# Patient Record
Sex: FEMALE
[De-identification: names, ages, dates, MRNs, and addresses within clinical notes are randomized per-mention and may not be internally consistent; named-entity substitution may affect disease eponyms.]

---

## 2019-02-28 ENCOUNTER — APPOINTMENT (OUTPATIENT)
Dept: ORTHOPEDIC SURGERY | Facility: CLINIC | Age: 69
End: 2019-02-28
Payer: MEDICARE

## 2019-02-28 VITALS
DIASTOLIC BLOOD PRESSURE: 68 MMHG | SYSTOLIC BLOOD PRESSURE: 117 MMHG | HEART RATE: 112 BPM | BODY MASS INDEX: 24.54 KG/M2 | HEIGHT: 60 IN | WEIGHT: 125 LBS

## 2019-02-28 PROCEDURE — 99214 OFFICE O/P EST MOD 30 MIN: CPT

## 2019-02-28 PROCEDURE — 73110 X-RAY EXAM OF WRIST: CPT | Mod: RT

## 2019-02-28 RX ORDER — MOMETASONE FUROATE 1 MG/G
0.1 CREAM TOPICAL
Qty: 45 | Refills: 0 | Status: ACTIVE | COMMUNITY
Start: 2018-12-12

## 2019-02-28 RX ORDER — PRAVASTATIN SODIUM 80 MG/1
80 TABLET ORAL
Qty: 90 | Refills: 0 | Status: ACTIVE | COMMUNITY
Start: 2017-12-18

## 2019-02-28 NOTE — DISCUSSION/SUMMARY
[FreeTextEntry1] : The patient has right ulnar wrist pain. Pain began, associated with hyperpronation of the wrist. Pain has been present approximately one year. \par Wrist support splint provided.\par I performed additional review of radiographs after the patient left the office. Review of radiographs demonstrates  scapholunate dissociation and suggestion of flexion of scaphoid. Possibly, scapholunate disruption/dissociation could cause ulnar wrist pain, although relatively uncommon.\par \par Second most likely diagnosis is TFCC tear. Cortisone injection may be indicated depending upon clinical course and MRI findings.If the patient continues with pain, MRI will be obtained.\par I have left a voicemail message on the patient's home telephone on describing the scapholunate dissociation.\par The patient should call or return if symptoms continue. If wrist splint is effective in controlling symptoms, no further treatment required.\par Because symptoms have been present for one year, prognosis, limited.\par  A lengthy and detailed discussion was held with the patient regarding analysis, treatment, and recommendations. All questions have been answered. At the conclusion the patient expressed acceptance and understanding.

## 2019-02-28 NOTE — PHYSICAL EXAM
[de-identified] : right wrist:\par tenderness ulnar mid axis at TFCC.\par Mod tenderness dorsal TFCC.\par ECU/6th compartment mild tenderness.\par some tenderness palpating triquetrum and hamate dorso ulnar.\par 5th CMC: min discomfort w palp and manip.\par LT:stable, 2+\par PT: no pain\par FCU: no pain\par Wrist E/F: 60/50°, mild pain at maximum flexion\par P/S:65°/85° without pain\par basal jt mild crep. min pain\par thumb MP: enlarged, E/F: -10/20 degrees, considerable stiffness, min pain\par No A1 pulley tenderness and no triggering in any finger.\par No pertinent MP, PIP, or DIP joint contributory findings, except some Heberden's nodes; none are clinically painful.\par \par Left wrist\par basal joint: Dorsal subluxation first metacarpal base. When reduced it immediately re re subluxes. \par No pain associated minimal to no crepitus associated with this manipulation. \par MP hyperextension 30°. No pain at MP or basal joint with manipulation. No crepitus.\par Otherwise, comparable wrist joint examination compared to right: no pertinent positive findings.\par Left hand\par No A1 pulley tenderness and no triggering in any finger.\par No pertinent MP, PIP, or DIP joint contributory findings, except some Heberden's nodes; none are clinically painful.\par \par Neurologic: Median, ulnar, and radial motor and sensory are intact. \par Skin: No cyanosis, clubbing, or rashes.\par Vascular: Radial pulses intact.\par Lymphatic: No streaking or epitrochlear adenopathy.\par The patient is awake, alert, and oriented. Affect appropriate. Cooperative.  [de-identified] : Radiographs ordered and interpreted by me today, reviewed and discussed with the patient today.\par 4 views, right wrist, right hand.\par Wrist: Scapholunate widening. Corresponding flexion of scaphoid on PA radiographs. Broad ulnar head. Ulnar neutral variance. Sclerosis, sigmoid notch. Basal joint narrowing. Volar beak osteophyte of first metacarpal consistent with basal joint osteoarthritis. Stage II. Advanced osteoarthritic degenerative changes thumb MP joint. Large area her motion, dorsal proximal two thirds articular surface, proximal phalanx base. Large osteophytes, metacarpal head. Minimal marginal degenerative change with slight narrowing, PIP 5, and DIP, 2, 3, 4, 5. No fractures or dislocations.

## 2019-02-28 NOTE — HISTORY OF PRESENT ILLNESS
[FreeTextEntry1] : Pt is a 67 yo RHD female retired  presents with new pain in ulnar aspect of the Right wrist for approximately 1 year. Patient reports about 1 year ago she accidently twisted he right wrist inward, and as a result she has been experiencing pain in the ulnar aspect of the right wrist. Patient states doing certain movements such as; rotating the right wrist, turning a door knob, opening jars, lifting objects, exacerbate the pain. She has applied voltaren gel or CBD oil over the right wrist for pain as needed, which have helped. No prior cortisone injections in the right wrist. Patient states she sometimes wraps an ACE bandage around the right wrist, which helps. \par \par Hx. B/L 1st CMC OA\par History of marked degenerative changes with associated pain MP joint right thumb. the patient's had multiple cortisone injections in the past. \par B/L CTR 2002 and 2008.

## 2019-03-01 ENCOUNTER — MESSAGE (OUTPATIENT)
Age: 69
End: 2019-03-01

## 2020-03-05 ENCOUNTER — APPOINTMENT (OUTPATIENT)
Dept: ORTHOPEDIC SURGERY | Facility: CLINIC | Age: 70
End: 2020-03-05
Payer: MEDICARE

## 2020-03-05 VITALS
BODY MASS INDEX: 24.54 KG/M2 | DIASTOLIC BLOOD PRESSURE: 79 MMHG | SYSTOLIC BLOOD PRESSURE: 134 MMHG | HEIGHT: 60 IN | WEIGHT: 125 LBS | HEART RATE: 94 BPM

## 2020-03-05 PROCEDURE — 73110 X-RAY EXAM OF WRIST: CPT | Mod: LT

## 2020-03-05 PROCEDURE — 99214 OFFICE O/P EST MOD 30 MIN: CPT

## 2021-01-07 ENCOUNTER — APPOINTMENT (OUTPATIENT)
Dept: ORTHOPEDIC SURGERY | Facility: CLINIC | Age: 71
End: 2021-01-07
Payer: MEDICARE

## 2021-01-07 PROCEDURE — 99214 OFFICE O/P EST MOD 30 MIN: CPT | Mod: 25

## 2021-01-07 PROCEDURE — 20526 THER INJECTION CARP TUNNEL: CPT | Mod: RT

## 2021-02-09 ENCOUNTER — APPOINTMENT (OUTPATIENT)
Dept: OTOLARYNGOLOGY | Facility: CLINIC | Age: 71
End: 2021-02-09
Payer: MEDICARE

## 2021-02-09 VITALS
BODY MASS INDEX: 22.18 KG/M2 | WEIGHT: 110 LBS | HEART RATE: 97 BPM | SYSTOLIC BLOOD PRESSURE: 155 MMHG | HEIGHT: 59 IN | TEMPERATURE: 97 F | DIASTOLIC BLOOD PRESSURE: 78 MMHG

## 2021-02-09 DIAGNOSIS — R63.4 ABNORMAL WEIGHT LOSS: ICD-10-CM

## 2021-02-09 DIAGNOSIS — R22.0 LOCALIZED SWELLING, MASS AND LUMP, HEAD: ICD-10-CM

## 2021-02-09 DIAGNOSIS — F90.9 ATTENTION-DEFICIT HYPERACTIVITY DISORDER, UNSPECIFIED TYPE: ICD-10-CM

## 2021-02-09 DIAGNOSIS — H93.13 TINNITUS, BILATERAL: ICD-10-CM

## 2021-02-09 DIAGNOSIS — R22.1 LOCALIZED SWELLING, MASS AND LUMP, HEAD: ICD-10-CM

## 2021-02-09 PROCEDURE — 92563 TONE DECAY HEARING TEST: CPT

## 2021-02-09 PROCEDURE — 92588 EVOKED AUDITORY TST COMPLETE: CPT

## 2021-02-09 PROCEDURE — 31231 NASAL ENDOSCOPY DX: CPT

## 2021-02-09 PROCEDURE — 92570 ACOUSTIC IMMITANCE TESTING: CPT

## 2021-02-09 PROCEDURE — 92557 COMPREHENSIVE HEARING TEST: CPT

## 2021-02-09 PROCEDURE — 99214 OFFICE O/P EST MOD 30 MIN: CPT | Mod: 25

## 2021-02-09 RX ORDER — BROMPHENIRAMINE MALEATE, PSEUDOEPHEDRINE HYDROCHLORIDE, 2; 30; 10 MG/5ML; MG/5ML; MG/5ML
30-2-10 SYRUP ORAL
Qty: 180 | Refills: 0 | Status: DISCONTINUED | COMMUNITY
Start: 2018-11-20 | End: 2021-02-09

## 2021-02-09 RX ORDER — OMEPRAZOLE 20 MG/1
20 CAPSULE, DELAYED RELEASE ORAL
Qty: 90 | Refills: 0 | Status: DISCONTINUED | COMMUNITY
Start: 2018-08-30 | End: 2021-02-09

## 2021-02-09 NOTE — CONSULT LETTER
[Dear  ___] : Dear  [unfilled], [Courtesy Letter:] : I had the pleasure of seeing your patient, [unfilled], in my office today. [Please see my note below.] : Please see my note below. [Consult Closing:] : Thank you very much for allowing me to participate in the care of this patient.  If you have any questions, please do not hesitate to contact me. [Sincerely,] : Sincerely, [FreeTextEntry3] : Saleem Gambino MD FACS

## 2021-02-09 NOTE — ASSESSMENT
[FreeTextEntry1] : Reviewed and reconciled medications, allergies, PMHx, PSHx, SocHx, FMHx.\par \par h/o cough, reflux, dysphagia, CP bar - doing well \par edema postcricoid, arytenoids and VC's\par \par Audio: hearing WNL sloping to a moderately severe SNHL, asymmetry noted at 1-1.5 k hz and 3-4k hz, right 72% discrimination at 70 db, left 80% discrimination at 70 db\par \par Plan:\par Flexible Nasal Endoscopy with a Look at the Larynx. Audio - results interpreted by Dr. Gambino and reviewed with the patient. Consider amplification. Lab work: T3, T4, TSH. ABR. FU after tests.

## 2021-02-09 NOTE — HISTORY OF PRESENT ILLNESS
[de-identified] : The patient presents with h/o cough, reflux, dysphagia, CP bar. Pt states that her sx have been better. She will occasionally gag on liquids. Her sinuses have been good. She has tinnitus which is worse at night. Pt woke up with a sore throat on the right side 1 week ago. She gets swollen glands on the right side intermittently for months. She states that 2 years ago she had a sinus infection and since then she has had a problem with her smell and taste. She states that she has lost 20 pounds and is hyper. She has been taking collagen which has gotten rid of her IBS.

## 2021-02-09 NOTE — ADDENDUM
[FreeTextEntry1] : Documented by Elaine Patino acting as scribe for Dr. Gambino on 02/09/2021.\par \par All Medical record entries made by the Scribe were at my, Dr. Gambino, direction and personally dictated by me on 02/09/2021 . I have reviewed the chart and agree that the record accurately reflects my personal performance of the history, physical exam, assessment and plan. I have also personally directed, reviewed, and agreed with the discharge instructions.

## 2021-02-09 NOTE — PROCEDURE
[FreeTextEntry6] : Procedure:  Flexible Nasal Endoscopy with a Look at the Larynx: Risks, benefits, and alternatives of flexible endoscopy were explained to the patient.  Specific mention was made of the risk of throat numbness.  The patient gave oral consent to proceed.  The nasal cavities were decongested and anesthetized with a combination of oxymetazoline and 4% lidocaine solution.  The flexible scope was inserted into the right nasal cavity.  Endoscopy of the inferior and middle meatus was performed.  No polyp, mass, or lesion was appreciated.  Olfactory cleft was clear. Spheno-ethmoid recess clear. Nasopharynx was clear.  Turbinates were without mass. Procedure repeated on the contralateral side with similar findings. Oropharyngeal walls were symmetric and mobile without lesion, mass, or edema.  Hypopharynx was also without  lesion or edema.  Larynx was mobile without lesions. Edema postcricoid, arytenoids and VC's. Base of tongue was within normal limits. The patient tolerated the procedure well.

## 2021-02-09 NOTE — PHYSICAL EXAM
[Midline] : trachea located in midline position [Normal] : no rashes [de-identified] : right submandibular gland larger than the left, soft [Hearing Loss Right Only] : normal [Hearing Loss Left Only] : normal [FreeTextEntry5] : No response to cardona.

## 2021-02-09 NOTE — REVIEW OF SYSTEMS
[Seasonal Allergies] : seasonal allergies [Post Nasal Drip] : post nasal drip [Hearing Loss] : hearing loss [Ear Noises] : ear noises [Nasal Congestion] : nasal congestion [Recurrent Sinus Infections] : recurrent sinus infections [Sense Of Smell Problem] : sense of smell problem [Hoarseness] : hoarseness [Throat Clearing] : throat clearing [Throat Dryness] : throat dryness [Throat Itching] : throat itching [Depression] : depression [Swollen Glands] : swollen glands [FreeTextEntry7] : difficulty swallowing

## 2021-02-24 ENCOUNTER — APPOINTMENT (OUTPATIENT)
Dept: PHARMACY | Facility: CLINIC | Age: 71
End: 2021-02-24

## 2021-02-24 ENCOUNTER — APPOINTMENT (OUTPATIENT)
Dept: OTOLARYNGOLOGY | Facility: CLINIC | Age: 71
End: 2021-02-24

## 2021-03-03 ENCOUNTER — APPOINTMENT (OUTPATIENT)
Dept: PHARMACY | Facility: CLINIC | Age: 71
End: 2021-03-03
Payer: MEDICARE

## 2021-03-03 ENCOUNTER — APPOINTMENT (OUTPATIENT)
Dept: OTOLARYNGOLOGY | Facility: CLINIC | Age: 71
End: 2021-03-03
Payer: MEDICARE

## 2021-03-03 PROCEDURE — 92653 AEP NEURODIAGNOSTIC I&R: CPT

## 2021-03-03 PROCEDURE — V5299A: CUSTOM | Mod: NC

## 2021-03-09 ENCOUNTER — APPOINTMENT (OUTPATIENT)
Dept: OTOLARYNGOLOGY | Facility: CLINIC | Age: 71
End: 2021-03-09
Payer: MEDICARE

## 2021-03-09 VITALS
DIASTOLIC BLOOD PRESSURE: 80 MMHG | BODY MASS INDEX: 22.18 KG/M2 | HEIGHT: 59 IN | HEART RATE: 98 BPM | WEIGHT: 110 LBS | TEMPERATURE: 97.1 F | SYSTOLIC BLOOD PRESSURE: 140 MMHG

## 2021-03-09 DIAGNOSIS — J39.2 OTHER DISEASES OF PHARYNX: ICD-10-CM

## 2021-03-09 DIAGNOSIS — R13.10 DYSPHAGIA, UNSPECIFIED: ICD-10-CM

## 2021-03-09 PROCEDURE — 99214 OFFICE O/P EST MOD 30 MIN: CPT

## 2021-03-09 NOTE — ADDENDUM
[FreeTextEntry1] : Documented by Elaine Patino acting as scribe for Dr. Gambino on 03/09/2021.\par \par All Medical record entries made by the Scribe were at my, Dr. Gambino, direction and personally dictated by me on 03/09/2021 . I have reviewed the chart and agree that the record accurately reflects my personal performance of the history, physical exam, assessment and plan. I have also personally directed, reviewed, and agreed with the discharge instructions.

## 2021-03-09 NOTE — HISTORY OF PRESENT ILLNESS
[de-identified] : The patient presents with h/o cough, reflux, dysphagia, CP bar, asymmetric SNHL. Pt presents today for results. She would like to get amplification. She still has a problem swallowing when she eats too much at one time or if bending over.

## 2021-03-09 NOTE — ASSESSMENT
[FreeTextEntry1] : Reviewed and reconciled medications, allergies, PMHx, PSHx, SocHx, FMHx.\par \par h/o cough, reflux, dysphagia, CP bar, asymmetric SNHL\par \par Audio 2/9/21: hearing WNL sloping to a moderately severe SNHL, asymmetry noted at 1-1.5 k hz and 3-4k hz, right 72% discrimination at 70 db, left 80% discrimination at 70 db\par \par ABR 3/3/21: normal\par \par Lab work: T3 normal, T4 normal, TSH normal\par \par Plan:\par Reviewed audio, ABR, T3, T4, TSH results. Pace yourself while eating. FU with audiologist regarding amplification. Retest hearing if don't get amplification before 6 months.

## 2021-03-23 ENCOUNTER — APPOINTMENT (OUTPATIENT)
Dept: PHARMACY | Facility: CLINIC | Age: 71
End: 2021-03-23
Payer: SELF-PAY

## 2021-03-23 PROCEDURE — V5010 ASSESSMENT FOR HEARING AID: CPT | Mod: NC

## 2021-03-25 ENCOUNTER — APPOINTMENT (OUTPATIENT)
Dept: ORTHOPEDIC SURGERY | Facility: CLINIC | Age: 71
End: 2021-03-25
Payer: MEDICARE

## 2021-03-25 DIAGNOSIS — M18.12 UNILATERAL PRIMARY OSTEOARTHRITIS OF FIRST CARPOMETACARPAL JOINT, LEFT HAND: ICD-10-CM

## 2021-03-25 DIAGNOSIS — M25.532 PAIN IN LEFT WRIST: ICD-10-CM

## 2021-03-25 DIAGNOSIS — R20.0 ANESTHESIA OF SKIN: ICD-10-CM

## 2021-03-25 DIAGNOSIS — M25.531 PAIN IN RIGHT WRIST: ICD-10-CM

## 2021-03-25 DIAGNOSIS — R20.2 ANESTHESIA OF SKIN: ICD-10-CM

## 2021-03-25 PROCEDURE — 99214 OFFICE O/P EST MOD 30 MIN: CPT

## 2021-04-06 ENCOUNTER — APPOINTMENT (OUTPATIENT)
Dept: PHARMACY | Facility: CLINIC | Age: 71
End: 2021-04-06
Payer: SELF-PAY

## 2021-04-06 PROCEDURE — V5010 ASSESSMENT FOR HEARING AID: CPT

## 2021-04-15 ENCOUNTER — APPOINTMENT (OUTPATIENT)
Dept: PHARMACY | Facility: CLINIC | Age: 71
End: 2021-04-15
Payer: SELF-PAY

## 2021-04-15 PROCEDURE — V5261A: CUSTOM

## 2021-04-20 ENCOUNTER — OUTPATIENT (OUTPATIENT)
Dept: OUTPATIENT SERVICES | Facility: HOSPITAL | Age: 71
LOS: 1 days | End: 2021-04-20
Payer: MEDICARE

## 2021-04-20 VITALS
TEMPERATURE: 99 F | RESPIRATION RATE: 18 BRPM | OXYGEN SATURATION: 96 % | HEART RATE: 99 BPM | SYSTOLIC BLOOD PRESSURE: 140 MMHG | WEIGHT: 111.99 LBS | HEIGHT: 60 IN | DIASTOLIC BLOOD PRESSURE: 90 MMHG

## 2021-04-20 DIAGNOSIS — Z01.818 ENCOUNTER FOR OTHER PREPROCEDURAL EXAMINATION: ICD-10-CM

## 2021-04-20 DIAGNOSIS — Z98.890 OTHER SPECIFIED POSTPROCEDURAL STATES: Chronic | ICD-10-CM

## 2021-04-20 DIAGNOSIS — Z98.1 ARTHRODESIS STATUS: Chronic | ICD-10-CM

## 2021-04-20 DIAGNOSIS — Z98.890 OTHER SPECIFIED POSTPROCEDURAL STATES: ICD-10-CM

## 2021-04-20 DIAGNOSIS — Z98.49 CATARACT EXTRACTION STATUS, UNSPECIFIED EYE: Chronic | ICD-10-CM

## 2021-04-20 DIAGNOSIS — Z96.641 PRESENCE OF RIGHT ARTIFICIAL HIP JOINT: Chronic | ICD-10-CM

## 2021-04-20 DIAGNOSIS — G56.01 CARPAL TUNNEL SYNDROME, RIGHT UPPER LIMB: ICD-10-CM

## 2021-04-20 LAB
ANION GAP SERPL CALC-SCNC: 12 MMOL/L — SIGNIFICANT CHANGE UP (ref 5–17)
BUN SERPL-MCNC: 22 MG/DL — SIGNIFICANT CHANGE UP (ref 7–23)
CALCIUM SERPL-MCNC: 10.1 MG/DL — SIGNIFICANT CHANGE UP (ref 8.4–10.5)
CHLORIDE SERPL-SCNC: 107 MMOL/L — SIGNIFICANT CHANGE UP (ref 96–108)
CO2 SERPL-SCNC: 25 MMOL/L — SIGNIFICANT CHANGE UP (ref 22–31)
CREAT SERPL-MCNC: 0.71 MG/DL — SIGNIFICANT CHANGE UP (ref 0.5–1.3)
GLUCOSE SERPL-MCNC: 92 MG/DL — SIGNIFICANT CHANGE UP (ref 70–99)
HCT VFR BLD CALC: 38.3 % — SIGNIFICANT CHANGE UP (ref 34.5–45)
HGB BLD-MCNC: 12 G/DL — SIGNIFICANT CHANGE UP (ref 11.5–15.5)
MCHC RBC-ENTMCNC: 29.5 PG — SIGNIFICANT CHANGE UP (ref 27–34)
MCHC RBC-ENTMCNC: 31.3 GM/DL — LOW (ref 32–36)
MCV RBC AUTO: 94.1 FL — SIGNIFICANT CHANGE UP (ref 80–100)
NRBC # BLD: 0 /100 WBCS — SIGNIFICANT CHANGE UP (ref 0–0)
PLATELET # BLD AUTO: 347 K/UL — SIGNIFICANT CHANGE UP (ref 150–400)
POTASSIUM SERPL-MCNC: 4.9 MMOL/L — SIGNIFICANT CHANGE UP (ref 3.5–5.3)
POTASSIUM SERPL-SCNC: 4.9 MMOL/L — SIGNIFICANT CHANGE UP (ref 3.5–5.3)
RBC # BLD: 4.07 M/UL — SIGNIFICANT CHANGE UP (ref 3.8–5.2)
RBC # FLD: 13.1 % — SIGNIFICANT CHANGE UP (ref 10.3–14.5)
SODIUM SERPL-SCNC: 144 MMOL/L — SIGNIFICANT CHANGE UP (ref 135–145)
WBC # BLD: 8.31 K/UL — SIGNIFICANT CHANGE UP (ref 3.8–10.5)
WBC # FLD AUTO: 8.31 K/UL — SIGNIFICANT CHANGE UP (ref 3.8–10.5)

## 2021-04-20 PROCEDURE — U0005: CPT

## 2021-04-20 PROCEDURE — U0003: CPT

## 2021-04-20 PROCEDURE — G0463: CPT

## 2021-04-20 PROCEDURE — 85027 COMPLETE CBC AUTOMATED: CPT

## 2021-04-20 PROCEDURE — C9803: CPT

## 2021-04-20 PROCEDURE — 80048 BASIC METABOLIC PNL TOTAL CA: CPT

## 2021-04-20 RX ORDER — SODIUM CHLORIDE 9 MG/ML
3 INJECTION INTRAMUSCULAR; INTRAVENOUS; SUBCUTANEOUS EVERY 8 HOURS
Refills: 0 | Status: DISCONTINUED | OUTPATIENT
Start: 2021-04-23 | End: 2021-05-07

## 2021-04-20 RX ORDER — CHLORHEXIDINE GLUCONATE 213 G/1000ML
1 SOLUTION TOPICAL ONCE
Refills: 0 | Status: DISCONTINUED | OUTPATIENT
Start: 2021-04-23 | End: 2021-05-07

## 2021-04-20 RX ORDER — LIDOCAINE HCL 20 MG/ML
0.2 VIAL (ML) INJECTION ONCE
Refills: 0 | Status: DISCONTINUED | OUTPATIENT
Start: 2021-04-23 | End: 2021-05-07

## 2021-04-20 NOTE — H&P PST ADULT - ATTENDING COMMENTS
The patient had right carpal tunnel release approximately 2002 and reports having had considerable relief until summer 2020. Symptoms were initially intermittent and have now become constant in the thumb index and long fingers. Provocative testing, Kim test, does not exacerbate symptoms. Repeat electrodiagnostic study demonstrated carpal tunnel syndrome on the right. Cortisone injection administered 2 months ago provided limited improvement. The slight improvement suggests that with surgery the patient may also have some improvement. However, complete resolution of carpal tunnel syndrome is not likely.     Consequently, because the patient has symptoms that are consistent with recurrence of right carpal tunnel syndrome, because electrodiagnostic studies confirm right carpal tunnel syndrome, and because the patient had transient improvement from cortisone injection, the patient has a chance that there will be some improvement after carpal tunnel release, but without any assurance or guarantee. Treatment options are very limited. Any long-term benefit from other treatment modalities is very unlikely.     I reviewed all of the above and many other factors at length and in detail with the patient in the office, and reviewed them again preop. In addition, because the patient had previous carpal tunnel release on the right, there is high risk of complication or some type of injury to the median nerve with a repeat carpal tunnel release. Risks, complications, expectations, and limitations have been discussed with the patient. Patient expressed acceptance and understanding of all above factors.     Surgery for right carpal tunnel syndrome is indicated because of symptoms refractory to conservative treatment, interfering with sleep, and activities of daily living. Because of the duration and severity of carpal tunnel syndrome, because the patient having repeat carpal tunnel release there is increased risk of region median nerve compared to unoperated carpal tunnel. In addition, because of the considerable intensity and severity of symptoms and the degree of  abnormality of the recent electrodiagnostic study, ongoing symptoms can be expected postoperatively. While the patient may see improvement, there is no assurance of this. The possibility of little improvement or of no improvement exists. Even though it is low, the possibility of worsening exists as well. Risk of injury to the median nerve, CRPS, persistent paresthesias, wound related pain, weakness, and many other factors, reviewed and discussed.     A lengthy and detailed discussion has been held with the patient. The surgical plan, alternative treatments, and the associated risks, complications, limitations, and expectations have been discussed with the patient. Postoperative plan, need for exercising to regain motion and function, wound care, dressing care, activities, follow up, and possible need for hand therapy have been reviewed and discussed. In addition, the expectation of postop wound related pain, wound induration, swelling, weakness of , alteration of hand and finger use and function, and palmar and forearm tenderness were reviewed. In particular, the expectation of weakness, difficulty with daily activities, and wound induration often lasting six months have been stressed.     All questions have been answered. The patient has expressed understanding and acceptance of the above and has consented to surgery.

## 2021-04-20 NOTE — H&P PST ADULT - HISTORY OF PRESENT ILLNESS
This is a 70 year old female with past medical history of HTN, HLD h/o murmur,     Reports having right hand numbness for one year and  recently noticing items were dropping from the hands. Tried conservative treatments with steriodal injections. Pt is now presenting to PST for scheduled Right wrist carpal tunnel release on 4/23 with Dr. Berg      **Covid test results pending  This is a 70 year old female with past medical history of HTN, HLD h/o murmur, Right hip replacement spinal stenosis s/p lumbar spinal fusion. Reports having right hand numbness for one year and  recently noticing items were dropping from the hands. Tried conservative treatments with steriodal injections. Pt is now presenting to PST for scheduled Right wrist carpal tunnel release on 4/23 with Dr. Berg      **Covid test results pending 4/20

## 2021-04-20 NOTE — H&P PST ADULT - NSICDXPASTMEDICALHX_GEN_ALL_CORE_FT
PAST MEDICAL HISTORY:  ADD (attention deficit disorder)     H/O osteopenia     HLD (hyperlipidemia)     HTN (hypertension)     OA (osteoarthritis)

## 2021-04-20 NOTE — H&P PST ADULT - NSICDXPASTSURGICALHX_GEN_ALL_CORE_FT
PAST SURGICAL HISTORY:  H/O sinus surgery Sinus and pillar implants 2008    H/O spinal fusion Dec 2013    History of carpal tunnel release Right 2002    History of carpal tunnel release Left 2008    History of right hip replacement June 2020    S/P cataract surgery

## 2021-04-20 NOTE — H&P PST ADULT - HEALTH CARE MAINTENANCE
Follows up PCP every 6 months and Cardiology annually   Fully Covid vaccinated (Pfizer series)   Mammo 2019& Colonoscopy 2020

## 2021-04-20 NOTE — H&P PST ADULT - NSICDXPROBLEM_GEN_ALL_CORE_FT
PROBLEM DIAGNOSES  Problem: History of carpal tunnel surgery  Assessment and Plan: Scheduled for Right wrist carpal tunnel release  on 4/23 with Dr. Berg  Preop isntructions and chlorhexidine soap  Labs CBC BMP performed in PST  Covid test results pending.

## 2021-04-22 ENCOUNTER — TRANSCRIPTION ENCOUNTER (OUTPATIENT)
Age: 71
End: 2021-04-22

## 2021-04-22 RX ORDER — SODIUM CHLORIDE 9 MG/ML
1000 INJECTION, SOLUTION INTRAVENOUS
Refills: 0 | Status: DISCONTINUED | OUTPATIENT
Start: 2021-04-23 | End: 2021-05-07

## 2021-04-23 ENCOUNTER — OUTPATIENT (OUTPATIENT)
Dept: OUTPATIENT SERVICES | Facility: HOSPITAL | Age: 71
LOS: 1 days | End: 2021-04-23
Payer: MEDICARE

## 2021-04-23 ENCOUNTER — APPOINTMENT (OUTPATIENT)
Dept: ORTHOPEDIC SURGERY | Facility: HOSPITAL | Age: 71
End: 2021-04-23

## 2021-04-23 VITALS
TEMPERATURE: 98 F | OXYGEN SATURATION: 98 % | DIASTOLIC BLOOD PRESSURE: 66 MMHG | HEART RATE: 81 BPM | RESPIRATION RATE: 16 BRPM | SYSTOLIC BLOOD PRESSURE: 117 MMHG

## 2021-04-23 VITALS
HEART RATE: 70 BPM | HEIGHT: 60 IN | WEIGHT: 111.99 LBS | OXYGEN SATURATION: 98 % | SYSTOLIC BLOOD PRESSURE: 107 MMHG | TEMPERATURE: 98 F | DIASTOLIC BLOOD PRESSURE: 69 MMHG | RESPIRATION RATE: 20 BRPM

## 2021-04-23 DIAGNOSIS — Z96.641 PRESENCE OF RIGHT ARTIFICIAL HIP JOINT: Chronic | ICD-10-CM

## 2021-04-23 DIAGNOSIS — G56.01 CARPAL TUNNEL SYNDROME, RIGHT UPPER LIMB: ICD-10-CM

## 2021-04-23 DIAGNOSIS — Z98.890 OTHER SPECIFIED POSTPROCEDURAL STATES: Chronic | ICD-10-CM

## 2021-04-23 DIAGNOSIS — Z01.818 ENCOUNTER FOR OTHER PREPROCEDURAL EXAMINATION: ICD-10-CM

## 2021-04-23 DIAGNOSIS — Z98.1 ARTHRODESIS STATUS: Chronic | ICD-10-CM

## 2021-04-23 DIAGNOSIS — Z98.49 CATARACT EXTRACTION STATUS, UNSPECIFIED EYE: Chronic | ICD-10-CM

## 2021-04-23 PROCEDURE — 64721 CARPAL TUNNEL SURGERY: CPT | Mod: RT

## 2021-04-23 RX ORDER — FEXOFENADINE HCL 30 MG
1 TABLET ORAL
Qty: 0 | Refills: 0 | DISCHARGE

## 2021-04-23 RX ORDER — ETODOLAC 400 MG/1
1 TABLET, FILM COATED ORAL
Qty: 0 | Refills: 0 | DISCHARGE

## 2021-04-23 RX ORDER — FLUTICASONE PROPIONATE 50 MCG
1 SPRAY, SUSPENSION NASAL
Qty: 0 | Refills: 0 | DISCHARGE

## 2021-04-23 RX ORDER — METHYLPHENIDATE HCL 5 MG
1 TABLET ORAL
Qty: 0 | Refills: 0 | DISCHARGE

## 2021-04-23 RX ORDER — ACETAMINOPHEN 500 MG
625 TABLET ORAL
Qty: 0 | Refills: 0 | DISCHARGE

## 2021-04-23 RX ORDER — LISINOPRIL/HYDROCHLOROTHIAZIDE 10-12.5 MG
1 TABLET ORAL
Qty: 0 | Refills: 0 | DISCHARGE

## 2021-04-23 RX ORDER — ESCITALOPRAM OXALATE 10 MG/1
1 TABLET, FILM COATED ORAL
Qty: 0 | Refills: 0 | DISCHARGE

## 2021-04-23 NOTE — ASU PREOP CHECKLIST - BSA (M2)
12.9   8.8   )-----------( 191      ( 17 Dec 2017 01:50 )             37.9     17 Dec 2017 01:50    140    |  105    |  13     ----------------------------<  132    3.4     |  23     |  0.57     Ca    9.1        17 Dec 2017 01:50    TPro  7.2    /  Alb  4.5    /  TBili  0.4    /  DBili  x      /  AST  29     /  ALT  25     /  AlkPhos  46     17 Dec 2017 01:50    PT/INR - ( 17 Dec 2017 01:50 )   PT: 11.9 sec;   INR: 1.09 ratio         PTT - ( 17 Dec 2017 01:50 )  PTT:31.7 sec  Vital Signs Last 24 Hrs  T(C): 36.9 (12-17-17 @ 06:26), Max: 36.9 (12-17-17 @ 06:26)  T(F): 98.5 (12-17-17 @ 06:26), Max: 98.5 (12-17-17 @ 06:26)  HR: 70 (12-17-17 @ 06:26) (70 - 78)  BP: 113/72 (12-17-17 @ 06:26) (99/62 - 116/69)  BP(mean): --  RR: 16 (12-17-17 @ 06:26) (16 - 16)  SpO2: 100% (12-17-17 @ 06:26) (97% - 100%)    Imaging: XR/CT were personally reviewed and demonstrates Left valgus impacted femoral neck fracture and a Right inferior pubic ramus fracture 1.46

## 2021-04-29 ENCOUNTER — APPOINTMENT (OUTPATIENT)
Dept: ORTHOPEDIC SURGERY | Facility: CLINIC | Age: 71
End: 2021-04-29
Payer: MEDICARE

## 2021-04-29 DIAGNOSIS — R43.0 ANOSMIA: ICD-10-CM

## 2021-04-29 DIAGNOSIS — M19.041 PRIMARY OSTEOARTHRITIS, RIGHT HAND: ICD-10-CM

## 2021-04-29 PROCEDURE — 99024 POSTOP FOLLOW-UP VISIT: CPT

## 2021-05-25 ENCOUNTER — APPOINTMENT (OUTPATIENT)
Dept: PHARMACY | Facility: CLINIC | Age: 71
End: 2021-05-25
Payer: SELF-PAY

## 2021-05-25 PROBLEM — M19.90 UNSPECIFIED OSTEOARTHRITIS, UNSPECIFIED SITE: Chronic | Status: ACTIVE | Noted: 2021-04-20

## 2021-05-25 PROBLEM — I10 ESSENTIAL (PRIMARY) HYPERTENSION: Chronic | Status: ACTIVE | Noted: 2021-04-20

## 2021-05-25 PROBLEM — E78.5 HYPERLIPIDEMIA, UNSPECIFIED: Chronic | Status: ACTIVE | Noted: 2021-04-20

## 2021-05-25 PROBLEM — F98.8 OTHER SPECIFIED BEHAVIORAL AND EMOTIONAL DISORDERS WITH ONSET USUALLY OCCURRING IN CHILDHOOD AND ADOLESCENCE: Chronic | Status: ACTIVE | Noted: 2021-04-20

## 2021-05-25 PROBLEM — Z87.39 PERSONAL HISTORY OF OTHER DISEASES OF THE MUSCULOSKELETAL SYSTEM AND CONNECTIVE TISSUE: Chronic | Status: ACTIVE | Noted: 2021-04-20

## 2021-05-25 PROCEDURE — V5299A: CUSTOM | Mod: NC

## 2021-06-04 ENCOUNTER — TRANSCRIPTION ENCOUNTER (OUTPATIENT)
Age: 71
End: 2021-06-04

## 2021-06-08 ENCOUNTER — APPOINTMENT (OUTPATIENT)
Dept: PHARMACY | Facility: CLINIC | Age: 71
End: 2021-06-08
Payer: MEDICARE

## 2021-06-08 PROCEDURE — V5299A: CUSTOM | Mod: NC

## 2022-05-20 ENCOUNTER — APPOINTMENT (OUTPATIENT)
Dept: OTOLARYNGOLOGY | Facility: CLINIC | Age: 72
End: 2022-05-20
Payer: MEDICARE

## 2022-05-20 VITALS
HEIGHT: 59 IN | HEART RATE: 108 BPM | WEIGHT: 110 LBS | BODY MASS INDEX: 22.18 KG/M2 | DIASTOLIC BLOOD PRESSURE: 76 MMHG | SYSTOLIC BLOOD PRESSURE: 127 MMHG

## 2022-05-20 PROCEDURE — 99213 OFFICE O/P EST LOW 20 MIN: CPT

## 2022-05-20 PROCEDURE — 92567 TYMPANOMETRY: CPT

## 2022-05-20 PROCEDURE — 92557 COMPREHENSIVE HEARING TEST: CPT

## 2022-05-20 RX ORDER — ALCLOMETASONE DIPROPIONATE 0.5 MG/G
0.05 CREAM TOPICAL
Refills: 0 | Status: ACTIVE | COMMUNITY

## 2022-05-20 RX ORDER — MUPIROCIN 20 MG/G
2 OINTMENT TOPICAL
Refills: 0 | Status: ACTIVE | COMMUNITY

## 2022-05-20 RX ORDER — GINGER ROOT/GINGER ROOT EXT 262.5 MG
CAPSULE ORAL
Refills: 0 | Status: ACTIVE | COMMUNITY

## 2022-05-20 RX ORDER — GUAIFENESIN 1200 MG/1
TABLET, EXTENDED RELEASE ORAL
Refills: 0 | Status: ACTIVE | COMMUNITY

## 2022-05-20 RX ORDER — MUPIROCIN 20 MG/G
2 OINTMENT TOPICAL
Qty: 1 | Refills: 0 | Status: ACTIVE | COMMUNITY
Start: 2019-01-23

## 2022-05-20 NOTE — ASSESSMENT
[FreeTextEntry1] : Simin Choi presents for follow-up. She notes increased allergic rhinitis symptoms and is requesting refill of her medications. She is continuing to take her oral antihistamine. In addition, she has tried using hearing aids but returned them due to discomfort. [facility] ABR was normal. Audiogram today was stable from prior. \par \par - Fluticasone and mupirocin ointment refilled.\par - Continue sinus rinses.\par - Continue oral antihistamine.\par - Follow up in 1 year for repeat audiogram.

## 2022-05-20 NOTE — HISTORY OF PRESENT ILLNESS
[de-identified] : Simin Choi is a 72 yo female with hx cough, reflux, dysphagia, CP bar, sensorineural hearing loss who presents for refill of fluticasone nasal spray. She notes history of allergic rhinitis and has had increased nasal congestion and rhinorrhea. She denies sinus pressure and has had sinus surgery previously. She has not had any recent sinus infections. She denies fevers or chills. She notes she has had previously allergy testing which was positive for multiple allergens. She has had previous immunotherapy. She generally uses fexofenadine and fluticasone nasal spray during her allergy seasons. She intermittently irrigates her sinuses every few days. She notes that her subjective hearing is essentially stable. She denies otalgia, otorrhea. She notes constant bilateral nonpulsatile tinnitus. She denies vertigo. She tried hearing aids last year, but returned them due to discomfort.\par \par She notes that her dysphagia and gagging are improved. She notes that her weight is stable.

## 2022-05-20 NOTE — REVIEW OF SYSTEMS
[Seasonal Allergies] : seasonal allergies [Hearing Loss] : hearing loss [Nasal Congestion] : nasal congestion [Negative] : Heme/Lymph

## 2022-05-20 NOTE — DATA REVIEWED
[de-identified] : Tymps: Type As AD, type A AS AU\par Audio: WNL sloping to mod sev SNHL 250-8000 Hz\par *Essentially stable thresholds/WRS re: audio 2/2021*\par Recs: 1. ENT f/u, 2. Re-eval as per MD

## 2022-09-14 ENCOUNTER — APPOINTMENT (OUTPATIENT)
Dept: OTOLARYNGOLOGY | Facility: CLINIC | Age: 72
End: 2022-09-14

## 2022-09-14 VITALS
HEART RATE: 97 BPM | DIASTOLIC BLOOD PRESSURE: 68 MMHG | SYSTOLIC BLOOD PRESSURE: 110 MMHG | HEIGHT: 59 IN | BODY MASS INDEX: 20.56 KG/M2 | WEIGHT: 102 LBS

## 2022-09-14 DIAGNOSIS — J38.4 EDEMA OF LARYNX: ICD-10-CM

## 2022-09-14 DIAGNOSIS — J31.0 CHRONIC RHINITIS: ICD-10-CM

## 2022-09-14 DIAGNOSIS — R49.0 DYSPHONIA: ICD-10-CM

## 2022-09-14 DIAGNOSIS — H90.5 UNSPECIFIED SENSORINEURAL HEARING LOSS: ICD-10-CM

## 2022-09-14 PROCEDURE — 31575 DIAGNOSTIC LARYNGOSCOPY: CPT

## 2022-09-14 PROCEDURE — 99213 OFFICE O/P EST LOW 20 MIN: CPT | Mod: 25

## 2022-09-15 ENCOUNTER — NON-APPOINTMENT (OUTPATIENT)
Age: 72
End: 2022-09-15

## 2022-09-16 NOTE — HISTORY OF PRESENT ILLNESS
[de-identified] : Patient states that since she had surgery as HSS on July 26 and has noticed that she has lost her voice. The more she speaks the faster she loses her vice

## 2022-09-16 NOTE — ASSESSMENT
[FreeTextEntry1] : Reviewed and reconciled medications, allergies, PMHx, PSHx, SocHx, FMHx.\par \par h/o cough, reflux, dysphagia, CP bar, asymmetric SNHL\par \par Procedure:  Flexible Fiberoptic Laryngoscopy: Risks, benefits, and alternatives of flexible endoscopy were explained to the patient.  The patient gave oral consent to proceed.  The flexible scope was inserted into the right nasal cavity and advanced towards the nasopharynx.  Visualized mucosa over the turbinates and septum were as described above.  The nasopharynx was clear.  Oropharyngeal walls were symmetric and mobile without lesion, mass, or edema.  Hypopharynx was also without  lesion or edema.  Larynx was mobile without lesions. Supraglottic structures with edema. True vocal cords with solving hemorrhage on th anterior commissure and aryepiglottic folds. True vocal folds were white without mass or lesion.  Base of tongue was within normal limits.\par \par \par Plan:\par videostrobe\par Follow up after test

## 2022-09-16 NOTE — CONSULT LETTER
[Consult Letter:] : I had the pleasure of evaluating your patient, [unfilled]. [Please see my note below.] : Please see my note below. [Consult Closing:] : Thank you very much for allowing me to participate in the care of this patient.  If you have any questions, please do not hesitate to contact me. [Sincerely,] : Sincerely, [FreeTextEntry2] : Dr. Ophelia De La Rosa,  [FreeTextEntry3] : Dr. Saleem Gambino

## 2022-09-27 ENCOUNTER — APPOINTMENT (OUTPATIENT)
Dept: OTOLARYNGOLOGY | Facility: CLINIC | Age: 72
End: 2022-09-27

## 2022-09-27 PROCEDURE — 31579 LARYNGOSCOPY TELESCOPIC: CPT | Mod: GN

## 2022-10-03 ENCOUNTER — NON-APPOINTMENT (OUTPATIENT)
Age: 72
End: 2022-10-03

## 2022-10-03 NOTE — ASSESSMENT
[FreeTextEntry1] : Speech/ Language/ Voice Evaluation and REPORT OF EVALUATION OF VOCAL FUNCTION VIA VIDEOSTROBOSCOPY AND BEHAVIORAL/PERCEPTUAL ANALYSIS:\par \par HISTORY: Information on this patient has been provided by the patient and via chart review. Simin Choi is a 72 year-old female who was seen for a videostroboscopy and behavioral voice evaluation to formally assess vocal function..\par \par REASON FOR REFERRAL: To formally assess vocal function. The patient was alert, pleasant and cooperative upon arrival to today's evaluation. The patient presents with complaints of a sudden onset of vocal changes after having shoulder replacement surgery on 7/26/22. Patient states always having a deep voice, however upon waking up from surgery she noted "lots of squeaking" and a "labored" voice. Further, she states voice has improved mildly, however it has not returned to baseline. the patient denies difficulty swallowing, recent history of pneumonia and unintentional weight loss. 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.2)\par Dysphagia (787.20) (R13.10)\par Hyperactivity (314.9) (F90.9)\par Laryngeal edema determined by laryngoscopy (478.6) (J38.4)\par Left wrist pain (719.43) (M25.532)\par Numbness and tingling in right hand (782.0) (R20.0,R20.2)\par Persistent hoarseness (784.42) (R49.0)\par Primary osteoarthritis of first carpometacarpal joint of left hand (715.14) (M18.12)\par Primary osteoarthritis of first carpometacarpal joint of right hand (715.14) (M18.11)\par Primary osteoarthritis of right hand (715.14) (M19.041)\par Right wrist pain (719.43) (M25.531)\par Slac (scapholunate advanced collapse) wrist, 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)\par SNHL (sensorineural hearing loss) (389.10) (H90.5)\par Submandibular swelling (784.2) (R22.0,R22.1)\par Tinnitus of both ears (388.30) (H93.13)\par 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)\par Weight loss of more than 10% body weight (783.21) (R63.4)\par \par Past Medical History\par History of Anosmia (781.1) (R43.0)\par History of Herniated disc (722.2)\par History of High cholesterol (272.0) (E78.00)\par History of arthritis (V13.4) (Z87.39)\par History of hypertension (V12.59) (Z86.79)\par History of spinal stenosis (V13.59) (Z87.39)\par History of Spondylisthesis (756.12) (M43.10)\par \par Current Meds\par Acetaminophen CAPS\par Alclometasone Dipropionate 0.05 % External Cream\par Calcium + D3 TABS\par Calcium TABS\par Ciprofloxacin 500 MG TABS\par Collagen CAPS\par Etodolac  MG Oral Tablet Extended Release 24 Hour\par Fexofenadine HCl TABS\par Fluticasone Propionate 50 MCG/ACT Nasal Suspension; SPRAY 2 SPRAYS INTO EACH\par NOSTRIL ONCE DAILY AS DIRECTED\par Lisinopril-hydroCHLOROthiazide 10-12.5 MG Oral Tablet\par Methylphenidate HCl ER 20 MG Oral Tablet Extended Release\par Mometasone Furoate 0.1 % External Cream; APPLY TO ECZEMA AREAS BID FOR 2\par WEEKS THEN PRN FOR FLARE UPS\par Mupirocin 2 % External Ointment; SALEEM TO LEFT NARES TID\par Mupirocin 2 % External Ointment\par Pravastatin Sodium 80 MG Oral Tablet; TK 1 T PO  QD\par \par Allergies\par oxyCODONE HCl TABS\par Other\par Seasonal Allergies\par \par Current Respiratory Status: _x_ Normal ___ Tracheostomy Tube\par \par VIDEOSTROBOSCOPY:\par The patient was explained the videostroboscopy procedure, and consent was obtained. A nasendoscope was passed via the right nasal passage and positioned above the supraglottic structures. The epiglottis, aryepiglottic folds, arytenoids, and true vocal folds were clearly visible with moderate edema and erythema of the arytenoids, interarytenoid and postcricoid space. There is thick mucous located in the valleculae at baseline. Assessment of the true vocal folds during quiet inspiration/expiration revealed moderate to severe edema, on R>L side, consistent with possible polypoid changes. In addition, there is localized deep red/purplish color along the anterior commissure, consistent with possible hemorrhagic changes. Assessment of TVF abduction and adduction revealed bilateral motion during phonation. There is, however, asymmetrical vibratory pattern with incomplete glottic closure across phonatory tasks. In addition, there is occasionally abnormal adduction of the anterior vocal cords during deep inspiration, which reduces glottic space and indicates possible vocal cord dysfunction. At this point, testing was discontinued and the scope carefully removed, with the patient tolerating the procedure without difficulty.\par \par BEHAVIORAL VOICE EVALUATION:\par Perceptually, Simin Choi presents with a moderate to severe dysphonia marked by a hoarse, strained and breathy vocal quality accompanied by impaired projection and restricted pitch range. In addition, the patient is noted to exhibit both breathy onset and use of hard glottal attack throughout conversational tasks. There is also evidence of perilaryngeal muscle tension and reduced coordination of respiratory/phonatory processes. Maximum phonation time is reduced as compared to age-matched peers. Resonance is WFL. \par \par IMPRESSIONS: Dysphonia\par \par PROGNOSIS: Good with therapeutic intervention\par \par RECOMMENDATIONS:\par 1) Voice therapy (CPT – 23897) is recommended 1x per week, for 6-8 weeks, to maximize vocal function.\par 2) Maintain good vocal hygiene, as briefly discussed with patient upon completion of today’s evaluation. A written educational handout was also provided.\par 3) Follow up with referring ENT as directed.\par \par EDUCATION: Evaluation results were discussed with the patient with full understanding demonstrated.\par \par Should you have additional questions/concerns, please contact this service at [phone].\par \par Codie Rivera M.S., CCC-SLP\par \par \par \par

## 2022-10-03 NOTE — ASSESSMENT
[FreeTextEntry1] : Speech/ Language/ Voice Evaluation and REPORT OF EVALUATION OF VOCAL FUNCTION VIA VIDEOSTROBOSCOPY AND BEHAVIORAL/PERCEPTUAL ANALYSIS:\par \par HISTORY: Information on this patient has been provided by the patient and via chart review. Simin Choi is a 72 year-old female who was seen for a videostroboscopy and behavioral voice evaluation to formally assess vocal function..\par \par REASON FOR REFERRAL: To formally assess vocal function. The patient was alert, pleasant and cooperative upon arrival to today's evaluation. The patient presents with complaints of a sudden onset of vocal changes after having shoulder replacement surgery on 7/26/22. Patient states always having a deep voice, however upon waking up from surgery she noted "lots of squeaking" and a "labored" voice. Further, she states voice has improved mildly, however it has not returned to baseline. the patient denies difficulty swallowing, recent history of pneumonia and unintentional weight loss. 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.2)\par Dysphagia (787.20) (R13.10)\par Hyperactivity (314.9) (F90.9)\par Laryngeal edema determined by laryngoscopy (478.6) (J38.4)\par Left wrist pain (719.43) (M25.532)\par Numbness and tingling in right hand (782.0) (R20.0,R20.2)\par Persistent hoarseness (784.42) (R49.0)\par Primary osteoarthritis of first carpometacarpal joint of left hand (715.14) (M18.12)\par Primary osteoarthritis of first carpometacarpal joint of right hand (715.14) (M18.11)\par Primary osteoarthritis of right hand (715.14) (M19.041)\par Right wrist pain (719.43) (M25.531)\par Slac (scapholunate advanced collapse) wrist, 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)\par SNHL (sensorineural hearing loss) (389.10) (H90.5)\par Submandibular swelling (784.2) (R22.0,R22.1)\par Tinnitus of both ears (388.30) (H93.13)\par 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)\par Weight loss of more than 10% body weight (783.21) (R63.4)\par \par Past Medical History\par History of Anosmia (781.1) (R43.0)\par History of Herniated disc (722.2)\par History of High cholesterol (272.0) (E78.00)\par History of arthritis (V13.4) (Z87.39)\par History of hypertension (V12.59) (Z86.79)\par History of spinal stenosis (V13.59) (Z87.39)\par History of Spondylisthesis (756.12) (M43.10)\par \par Current Meds\par Acetaminophen CAPS\par Alclometasone Dipropionate 0.05 % External Cream\par Calcium + D3 TABS\par Calcium TABS\par Ciprofloxacin 500 MG TABS\par Collagen CAPS\par Etodolac  MG Oral Tablet Extended Release 24 Hour\par Fexofenadine HCl TABS\par Fluticasone Propionate 50 MCG/ACT Nasal Suspension; SPRAY 2 SPRAYS INTO EACH\par NOSTRIL ONCE DAILY AS DIRECTED\par Lisinopril-hydroCHLOROthiazide 10-12.5 MG Oral Tablet\par Methylphenidate HCl ER 20 MG Oral Tablet Extended Release\par Mometasone Furoate 0.1 % External Cream; APPLY TO ECZEMA AREAS BID FOR 2\par WEEKS THEN PRN FOR FLARE UPS\par Mupirocin 2 % External Ointment; SALEEM TO LEFT NARES TID\par Mupirocin 2 % External Ointment\par Pravastatin Sodium 80 MG Oral Tablet; TK 1 T PO  QD\par \par Allergies\par oxyCODONE HCl TABS\par Other\par Seasonal Allergies\par \par Current Respiratory Status: _x_ Normal ___ Tracheostomy Tube\par \par VIDEOSTROBOSCOPY:\par The patient was explained the videostroboscopy procedure, and consent was obtained. A nasendoscope was passed via the right nasal passage and positioned above the supraglottic structures. The epiglottis, aryepiglottic folds, arytenoids, and true vocal folds were clearly visible with moderate edema and erythema of the arytenoids, interarytenoid and postcricoid space. There is thick mucous located in the valleculae at baseline. Assessment of the true vocal folds during quiet inspiration/expiration revealed moderate to severe edema, on R>L side, consistent with possible polypoid changes. In addition, there is localized deep red/purplish color along the anterior commissure, consistent with possible hemorrhagic changes. Assessment of TVF abduction and adduction revealed bilateral motion during phonation. There is, however, asymmetrical vibratory pattern with incomplete glottic closure across phonatory tasks. In addition, there is occasionally abnormal adduction of the anterior vocal cords during deep inspiration, which reduces glottic space and indicates possible vocal cord dysfunction. At this point, testing was discontinued and the scope carefully removed, with the patient tolerating the procedure without difficulty.\par \par BEHAVIORAL VOICE EVALUATION:\par Perceptually, Simin Choi presents with a moderate to severe dysphonia marked by a hoarse, strained and breathy vocal quality accompanied by impaired projection and restricted pitch range. In addition, the patient is noted to exhibit both breathy onset and use of hard glottal attack throughout conversational tasks. There is also evidence of perilaryngeal muscle tension and reduced coordination of respiratory/phonatory processes. Maximum phonation time is reduced as compared to age-matched peers. Resonance is WFL. \par \par IMPRESSIONS: Dysphonia\par \par PROGNOSIS: Good with therapeutic intervention\par \par RECOMMENDATIONS:\par 1) Voice therapy (CPT – 02574) is recommended 1x per week, for 6-8 weeks, to maximize vocal function.\par 2) Maintain good vocal hygiene, as briefly discussed with patient upon completion of today’s evaluation. A written educational handout was also provided.\par 3) Follow up with referring ENT as directed.\par \par EDUCATION: Evaluation results were discussed with the patient with full understanding demonstrated.\par \par Should you have additional questions/concerns, please contact this service at [phone].\par \par Codie Rivera M.S., CCC-SLP\par \par \par \par

## 2022-10-12 ENCOUNTER — APPOINTMENT (OUTPATIENT)
Dept: OTOLARYNGOLOGY | Facility: CLINIC | Age: 72
End: 2022-10-12

## 2022-10-18 ENCOUNTER — APPOINTMENT (OUTPATIENT)
Dept: OTOLARYNGOLOGY | Facility: CLINIC | Age: 72
End: 2022-10-18

## 2022-11-04 ENCOUNTER — APPOINTMENT (OUTPATIENT)
Dept: OTOLARYNGOLOGY | Facility: CLINIC | Age: 72
End: 2022-11-04

## 2022-11-04 VITALS
DIASTOLIC BLOOD PRESSURE: 77 MMHG | SYSTOLIC BLOOD PRESSURE: 132 MMHG | HEIGHT: 59 IN | BODY MASS INDEX: 20.96 KG/M2 | HEART RATE: 105 BPM | WEIGHT: 104 LBS

## 2022-11-04 PROCEDURE — 99213 OFFICE O/P EST LOW 20 MIN: CPT | Mod: 25

## 2022-11-04 PROCEDURE — 31575 DIAGNOSTIC LARYNGOSCOPY: CPT

## 2022-11-04 NOTE — HISTORY OF PRESENT ILLNESS
[de-identified] : Simin Choi presents for evaluation. She has history of dysphonia after intubation for surgery in July 2022. She was seen by SLP and found to have vocal cord edema, possible vocal cord dysfunction, and incomplete glottic closure. She did not undergo voice therapy. She notes that her voice has slightly improved but continues to feel it is hoarse. She denies dyspnea or aspiration episodes. 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 She denies odynophagia, heartburn, food regurgitation. She denies fevers or chills.

## 2022-11-04 NOTE — ASSESSMENT
[FreeTextEntry1] : Simin Schmidt presents for evaluation of dysphonia after intubation for surgery in 7/2022. She had videostroboscopy which showed bilateral vocal cord edema, glottic insufficiency, possible vocal cord dysfunction. She did not undergo voice therapy. Flexible larygnoscopy today shows glottic insufficiency and bilateral vocal cord edema. She also has postcricoid inflammation consistent with laryngopharyngeal reflux. Will treat reflux and send for voice therapy. Per her record, it states that she takes protonix but she does not believe she is on a reflux medication.\par \par - Famotidine 20 mg qhs\par - Antireflux precautions\par - Voice therapy\par - Follow up in 3 months

## 2023-01-14 ENCOUNTER — RX RENEWAL (OUTPATIENT)
Age: 73
End: 2023-01-14

## 2023-02-02 ENCOUNTER — APPOINTMENT (OUTPATIENT)
Dept: OTOLARYNGOLOGY | Facility: CLINIC | Age: 73
End: 2023-02-02
Payer: MEDICARE

## 2023-02-02 VITALS
DIASTOLIC BLOOD PRESSURE: 73 MMHG | HEART RATE: 106 BPM | HEIGHT: 59 IN | WEIGHT: 105 LBS | BODY MASS INDEX: 21.17 KG/M2 | SYSTOLIC BLOOD PRESSURE: 119 MMHG

## 2023-02-02 PROCEDURE — 99213 OFFICE O/P EST LOW 20 MIN: CPT | Mod: 25

## 2023-02-02 PROCEDURE — 31575 DIAGNOSTIC LARYNGOSCOPY: CPT

## 2023-02-02 RX ORDER — DONEPEZIL HYDROCHLORIDE 5 MG/1
5 TABLET, FILM COATED ORAL
Refills: 0 | Status: ACTIVE | COMMUNITY

## 2023-02-02 NOTE — HISTORY OF PRESENT ILLNESS
[de-identified] : Simin Choi presents for evaluation. She has history of dysphonia after intubation for surgery in July 2022. She was seen by SLP and found to have vocal cord edema, possible vocal cord dysfunction, and incomplete glottic closure. She did not undergo voice therapy. She notes that her voice has slightly improved but continues to feel it is hoarse. She denies dyspnea or aspiration episodes. 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 She denies odynophagia, heartburn, food regurgitation. She denies fevers or chills. [FreeTextEntry1] : 2/2/23 - Ms. Choi presents for follow-up. She notes significant improvement in voice and swallow since starting famotidine. She denies dysphagia, odynophagia ,heartburn, globus sensation, dyspnea. She denies fevers or chills.

## 2023-02-02 NOTE — ASSESSMENT
[FreeTextEntry1] : Simin Schmidt presents for follow-up of dysphonia after intubation for surgery in 7/2022. She had previous videostroboscopy which showed bilateral vocal cord edema, glottic insufficiency, possible vocal cord dysfunction. She was recommended to start voice therapy which she did not. She is on famotidine for laryngopharyngeal reflux. Flexible laryngoscopy today shows improved  vocal cord edema and better closure with phonation. There is moderate postcricoid inflammation. We again discussed voice therapy but she notes that her voice is back to baseline and defers voice therapy for now.\par \par - Famotidine 20 mg qhs x 3 months - refilled.\par - Antireflux precautions\par - Follow up in 3 months

## 2023-04-06 ENCOUNTER — APPOINTMENT (OUTPATIENT)
Dept: ORTHOPEDIC SURGERY | Facility: CLINIC | Age: 73
End: 2023-04-06
Payer: MEDICARE

## 2023-04-06 VITALS
HEIGHT: 59 IN | SYSTOLIC BLOOD PRESSURE: 97 MMHG | WEIGHT: 105 LBS | HEART RATE: 89 BPM | DIASTOLIC BLOOD PRESSURE: 60 MMHG | BODY MASS INDEX: 21.17 KG/M2

## 2023-04-06 DIAGNOSIS — M19.131 POST-TRAUMATIC OSTEOARTHRITIS, RIGHT WRIST: ICD-10-CM

## 2023-04-06 DIAGNOSIS — G56.01 CARPAL TUNNEL SYNDROME, RIGHT UPPER LIMB: ICD-10-CM

## 2023-04-06 DIAGNOSIS — M18.11 UNILATERAL PRIMARY OSTEOARTHRITIS OF FIRST CARPOMETACARPAL JOINT, RIGHT HAND: ICD-10-CM

## 2023-04-06 PROCEDURE — 20605 DRAIN/INJ JOINT/BURSA W/O US: CPT | Mod: RT

## 2023-04-06 PROCEDURE — 73110 X-RAY EXAM OF WRIST: CPT | Mod: RT

## 2023-04-06 PROCEDURE — 99214 OFFICE O/P EST MOD 30 MIN: CPT | Mod: 25

## 2023-04-07 RX ORDER — GUANFACINE 1 MG/1
1 TABLET ORAL
Qty: 30 | Refills: 0 | Status: ACTIVE | COMMUNITY
Start: 2023-03-22

## 2023-04-07 RX ORDER — TOBRAMYCIN AND DEXAMETHASONE 3; 1 MG/ML; MG/ML
0.3-0.1 SUSPENSION/ DROPS OPHTHALMIC
Qty: 5 | Refills: 0 | Status: ACTIVE | COMMUNITY
Start: 2023-03-02

## 2023-04-07 RX ORDER — ESCITALOPRAM OXALATE 20 MG/1
20 TABLET ORAL
Qty: 90 | Refills: 0 | Status: ACTIVE | COMMUNITY
Start: 2023-03-22

## 2023-04-07 RX ORDER — ALCLOMETASONE DIPROPIONATE 0.5 MG/G
0.05 OINTMENT TOPICAL
Qty: 15 | Refills: 0 | Status: ACTIVE | COMMUNITY
Start: 2022-02-16

## 2023-04-07 RX ORDER — DONEPEZIL HYDROCHLORIDE 10 MG/1
10 TABLET ORAL
Qty: 90 | Refills: 0 | Status: ACTIVE | COMMUNITY
Start: 2023-04-02

## 2023-04-07 NOTE — DISCUSSION/SUMMARY
[FreeTextEntry1] : Patient has arthritic changes right wrist.  Today's radiographs show dramatic worsening compared to radiographs from 2019.\par   Radiographically, the appearance and position of the proximal carpal bones is unusual.  There is wide separation between scaphoid and lunate, scaphoid is flexed and the scaphoid has eroded into the scaphoid facet of radius.  The lunate appears to be subluxed dorsally and flexed.  In typical SL[facility], when scaphoid and lunate are dissociated, the lunate tilts dorsally and possibly shifts palmarly.  In this patient, the appearance of the lunate is the opposite of what would be expected.\par Clinically, it appears that the patient's symptoms are primarily related to the wrist arthritis.  Patient reports ongoing numbness and tingling in the median distribution.  However, in my opinion, this is due to residual median neuropathy.  By patient description the intensity and frequency of right hand median symptoms of numbness and tingling have not changed notably since the carpal tunnel revision surgery 2021.  \par \par To address the patient's primary problem of right wrist pain, the patient was treated with intra-articular steroid injection right wrist with 20 mg Kenalog.  This intra-articular steroid injection should treat wrist arthritis as well as the radiographic apparent chondrocalcinosis.  Long-term prognosis is guarded.\par If the patient has persistent or recurrent severe pain in the wrist, I recommend PRC surgery.  The proximal pole of the capitate appears relatively good although part of this is an assumption.  Because the capitate is not in contact with carpal bones except at the proximal radial corner where it is in contact with scaphoid, the articular surface most likely is in reasonable condition.  Another type of wrist surgery such as limited arthrodesis, in my opinion, is not indicated and might be quite difficult due to the atypical appearance and location of the lunate.\par I have reviewed all of the above and more with the patient.\par Patient will be monitored.\par Patient should return if her symptoms continue or recur.\par Prognosis uncertain.\par The following post-injection instructions were given to the patient: The patient should be cautious in activities for two weeks and then increase to full activities.  Thereafter, the patient should return if symptoms continue, or if they barrera and recur subsequently. If symptoms do not recur, the patient need not return and can be seen on an as needed basis. The patient has expressed understanding and acceptance of analysis, treatment, and recommendations.  All questions answered.

## 2023-04-07 NOTE — PHYSICAL EXAM
[de-identified] : Neurologic: \par Right hand \par Objective "pins-and-needles" sensation median distribution at rest.  \par Kim does not exacerbate symptoms.  \par Radial nerve motor and sensory and ulnar nerve motor and sensory are intact.   \par Left hand\par Normal sensation all fingers at rest.\par Phalen's test with median nerve compression: Negative\par \par Left wrist\par No tenderness pisiform or PT joint\par Crepitus with manipulation of pisiform against triquetrum.  Mild discomfort.\par Wrist flexion against resistance creates mild pain in this area.\par Mild tenderness TFCC ulnarly.\par Minimal dorsal or volar TFCC tenderness\par No notable tenderness over the dorsal radius, ulna carpus.\par Carpal bones nontender.\par Scaphoid thrust no pain.\par Hook of hamate nontender.\par Extensor tendons including ECU nontender.\par Left hand:\par Basal joint \par  First metacarpal grossly unstable and proximally migrated near radial styloid. \par Clinically dislocated and proximally migrated confirmed on previous radiograph.\par No crepitus.  Virtually no pain with manipulation. \par First metacarpal cannot be distracted and reduced against the trapezium. \par CMC 2, 3, 4, 5 no pain with manipulation or palpation.\par No A1 pulley tenderness and no triggering in any finger.\par No pertinent MP and PIP joint contributory findings. \par DIP joints: Heberden's nodes all digits\par \par Right wrist\par Mild swelling distal ulna/DRUJ\par Mild swelling radiocarpal joint line\par Minimal to mild tenderness TFCC and minimal pain with DRUJ manipulation\par Wrist E/F: 40/40 \par Mild pain at extreme flexion and extension \par Mild tenderness radiocarpal joint line.  \par No tenderness over scapholunate\par Scaphoid shift:\par \par Right hand:\par No A1 pulley tenderness and no triggering in any finger.\par No pertinent MP, PIP, or DIP joint contributory findings, except some Heberden's nodes; none are clinically painful.\par \par Skin: No cyanosis, clubbing, or rashes.\par Vascular: Radial pulses intact.\par Lymphatic: No streaking or epitrochlear adenopathy.\par The patient is awake, alert, and oriented. Affect appropriate. Cooperative.  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  Markedly flexed scaphoid.  \par Lunate subluxed dorsally.\par Contrary to typical findings, lunate appears to be flexed.\par Proximal articular surface of capitate is largely in the space between scaphoid and lunate.\par Possibly slight narrowing between the proximal radial corner of capitate and the distal aspect of scaphoid.\par Fluffy calcification visible between radius and ulna proximally and carpal bones distally.\par STT joint space reasonably open.\par Basal joint space appears relatively open.\par Marked degenerative change thumb MP joint with a large reactive bone exostoses on metacarpal head.\par Erosive change in the dorsal proximal aspect of thumb proximal phalanx.\par MP 2-5: Joint spaces well-maintained without degenerative change.\par PIP 2-5 minimal if any degenerative changes.\par Mild degenerative changes with some osteophyte formation and/or narrowing: Thumb IP joint, DIP 2-5\par No visible fractures or dislocations.

## 2023-04-07 NOTE — PROCEDURE
[FreeTextEntry1] : Diagnosis: Severe arthritis with marked pain, right wrist.\par Indications: severe wrist pain.\par After full discussion of treatment alternatives, and associated risks, complication, (including but not limited to infection, lingering numbness, finger stiffness, postinjection pain), limitations, and expectations, and with patient verbal consent, following verbal timeout site verification, a steroid injection was administered. Following sterile prep with sterile method, 1.0 cc Bupivacaine and 20 mg Kenalog/triamcinolone were injected into [default value] and was well tolerated without complication. \par The following post-injection instructions were given to the patient: The patient should be cautious in activities for two weeks and then increase to full activities.  Thereafter, the patient should return if symptoms continue, or if they barrera and recur subsequently. If symptoms do not recur, the patient need not return and can be seen on an as needed basis. The patient has expressed understanding and acceptance of analysis, treatment, and recommendations.  All questions answered.

## 2023-04-07 NOTE — HISTORY OF PRESENT ILLNESS
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  Motor amplitudes were more extensively reduced on the left than right.  Ulnar nerve abnormality was not localizable.\par \par Additional hand history\par 1.  Right CTR 2002 [facility]\par 2.  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  Left CTR 2008, Bartolome Carpenter MD\par 4.  Bilateral basal joint arthritis\par Left wrist dysplastic small trapezium with radial/proximal translation of first metacarpal and trapezium\par 5.  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 \par After considerable discussion about the degree of paresthesias with the patient, the patient concluded that the intensity and frequency of right hand "pins-and-needles" are essentially the same as they were following surgery.\par Patient was initially under the impression that she had recurrence of carpal tunnel syndrome.\par However, after discussing the nature of her symptoms, which are largely pain from the wrist joint and associated arthritis, and after discussing that the paresthesias have not changed in frequency or intensity since surgery, the patient came to realize that her primary problem is related to the arthritis in the right wrist.\par \par Patient reports acute pain that happens at times which patient is describes "takes my breath away"\par The pain "just shoots".\par Pt reports "splint is not helping"\par Patient associates the acute pain, after lengthy discussion today, with the wrist joint, movement of the wrist joint, gripping, holding, for example.\par Patient is aware of the right wrist pain as being distinctly different from the pins-and-needles pain.\par \par Patient reports that she is not having notable left wrist pain.\par Patient not aware of notable numbness or tingling in the left hand.\par Left carpal tunnel release performed approximately 15 years ago.\par Patient denies locking or triggering bilaterally.\par Patient has no other notable hand complaints today.\par

## 2023-04-17 ENCOUNTER — RX RENEWAL (OUTPATIENT)
Age: 73
End: 2023-04-17

## 2023-05-05 RX ORDER — FAMOTIDINE 20 MG/1
20 TABLET, FILM COATED ORAL
Qty: 90 | Refills: 0 | Status: ACTIVE | COMMUNITY
Start: 2022-11-04 | End: 1900-01-01

## 2023-05-17 ENCOUNTER — APPOINTMENT (OUTPATIENT)
Dept: OTOLARYNGOLOGY | Facility: CLINIC | Age: 73
End: 2023-05-17
Payer: MEDICARE

## 2023-05-17 VITALS
DIASTOLIC BLOOD PRESSURE: 70 MMHG | HEART RATE: 90 BPM | SYSTOLIC BLOOD PRESSURE: 111 MMHG | WEIGHT: 105 LBS | BODY MASS INDEX: 21.17 KG/M2 | HEIGHT: 59 IN

## 2023-05-17 DIAGNOSIS — R49.0 DYSPHONIA: ICD-10-CM

## 2023-05-17 DIAGNOSIS — J38.4 EDEMA OF LARYNX: ICD-10-CM

## 2023-05-17 DIAGNOSIS — K21.9 GASTRO-ESOPHAGEAL REFLUX DISEASE W/OUT ESOPHAGITIS: ICD-10-CM

## 2023-05-17 PROCEDURE — 99213 OFFICE O/P EST LOW 20 MIN: CPT | Mod: 25

## 2023-05-17 PROCEDURE — 31575 DIAGNOSTIC LARYNGOSCOPY: CPT

## 2023-05-17 RX ORDER — ANASTROZOLE TABLETS 1 MG/1
1 TABLET ORAL
Refills: 0 | Status: ACTIVE | COMMUNITY

## 2023-05-17 RX ORDER — CIPROFLOXACIN HYDROCHLORIDE 750 MG/1
TABLET, FILM COATED ORAL
Refills: 0 | Status: ACTIVE | COMMUNITY

## 2023-05-17 NOTE — PHYSICAL EXAM
[Midline] : trachea located in midline position [Laryngoscopy Performed] : laryngoscopy was performed, see procedure section for findings [Normal] : no rashes [FreeTextEntry1] : Deep voice

## 2023-05-17 NOTE — HISTORY OF PRESENT ILLNESS
[de-identified] : Simin Choi presents for evaluation. She has history of dysphonia after intubation for surgery in July 2022. She was seen by SLP and found to have vocal cord edema, possible vocal cord dysfunction, and incomplete glottic closure. She did not undergo voice therapy. She notes that her voice has slightly improved but continues to feel it is hoarse. She denies dyspnea or aspiration episodes. 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 She denies odynophagia, heartburn, food regurgitation. She denies fevers or chills. [FreeTextEntry1] : 2/2/23 - Ms. Choi presents for follow-up. She notes significant improvement in voice and swallow since starting famotidine. She denies dysphagia, odynophagia ,heartburn, globus sensation, dyspnea. She denies fevers or chills.\par \par 5/17/23 - Ms. Choi presents for follow-up. She notes that her  voice is normal. She denies significant dysphagia. She denies odynophagia but she does get occasional heartburn. She denies shortness of breath or aspiration episodes. She denies fevers or chills.

## 2023-05-17 NOTE — ASSESSMENT
[FreeTextEntry1] : Simin Schmidt presents for follow-up of dysphonia after intubation for surgery in 7/2022. She had previous videostroboscopy which showed bilateral vocal cord edema, glottic insufficiency, possible vocal cord dysfunction. She was recommended to start voice therapy which she did not. She is on famotidine for laryngopharyngeal reflux. She notes that her voice is still improved but is deep. Flexible laryngoscopy today shows good glottic closure, bilateral vocal cord edema, and moderate postcricoid inflammation. We again discussed voice therapy and she would like to proceed with this. .\par \par - Continue famotidine 20 mg qhs\par - Antireflux precautions\par - Start voice therapy\par - Follow up after voice therapy

## 2023-05-19 ENCOUNTER — NON-APPOINTMENT (OUTPATIENT)
Age: 73
End: 2023-05-19

## 2023-08-01 ENCOUNTER — APPOINTMENT (OUTPATIENT)
Dept: OTOLARYNGOLOGY | Facility: CLINIC | Age: 73
End: 2023-08-01
Payer: MEDICARE

## 2023-08-01 PROCEDURE — 92524 BEHAVRAL QUALIT ANALYS VOICE: CPT | Mod: GN,KX

## 2023-08-04 ENCOUNTER — APPOINTMENT (OUTPATIENT)
Dept: ORTHOPEDIC SURGERY | Facility: CLINIC | Age: 73
End: 2023-08-04
Payer: MEDICARE

## 2023-08-04 VITALS — BODY MASS INDEX: 21.17 KG/M2 | WEIGHT: 105 LBS | HEIGHT: 59 IN

## 2023-08-04 DIAGNOSIS — M23.91 UNSPECIFIED INTERNAL DERANGEMENT OF RIGHT KNEE: ICD-10-CM

## 2023-08-04 DIAGNOSIS — S93.602A UNSPECIFIED SPRAIN OF LEFT FOOT, INITIAL ENCOUNTER: ICD-10-CM

## 2023-08-04 PROCEDURE — 73630 X-RAY EXAM OF FOOT: CPT | Mod: LT

## 2023-08-04 NOTE — PHYSICAL EXAM
[Right] : right knee [NL (0)] : extension 0 degrees [5___] : hamstring 5[unfilled]/5 [Equivocal] : equivocal Nichole [Left] : left foot and ankle [5th] : 5th [4___] : eversion 4[unfilled]/5 [] : negative Lachmann [de-identified] : pain with up on left foot  [de-identified] : plantar flexion 10 degrees [de-identified] : inversion 10 degrees [de-identified] : eversion 10 degrees [TWNoteComboBox7] : dorsiflexion 10 degrees

## 2023-08-04 NOTE — HISTORY OF PRESENT ILLNESS
[5] : 5 [2] : 2 [Dull/Aching] : dull/aching [Sharp] : sharp [Intermittent] : intermittent [Nothing helps with pain getting better] : Nothing helps with pain getting better [de-identified] : 8-4-23- One week ago pt had fall injuring the left foot and right knee. [facility] of the foot taken was told no fractures. she has been ambulating with a limp and with assistance of a cane states somewhat improved. She is ambulating with the right leg in extension.  [] : no [FreeTextEntry1] : Rt Knee, LT Knee, LT foot [FreeTextEntry5] : PT fell 5 days ago. Pt fell, rolled her LT ankle and hit her RT knee into the floor.

## 2023-08-04 NOTE — IMAGING
[AP] : anteroposterior [Lateral] : lateral [[place]] : skyline [There are no fractures, subluxations or dislocations. No significant abnormalities are seen] : There are no fractures, subluxations or dislocations. No significant abnormalities are seen

## 2023-09-01 ENCOUNTER — APPOINTMENT (OUTPATIENT)
Dept: ORTHOPEDIC SURGERY | Facility: CLINIC | Age: 73
End: 2023-09-01
Payer: MEDICARE

## 2023-09-01 VITALS — HEIGHT: 59 IN | BODY MASS INDEX: 21.17 KG/M2 | WEIGHT: 105 LBS

## 2023-09-01 DIAGNOSIS — S80.01XA CONTUSION OF RIGHT KNEE, INITIAL ENCOUNTER: ICD-10-CM

## 2023-09-01 PROCEDURE — 99213 OFFICE O/P EST LOW 20 MIN: CPT

## 2023-09-01 NOTE — HISTORY OF PRESENT ILLNESS
[5] : 5 [2] : 2 [Dull/Aching] : dull/aching [Sharp] : sharp [Intermittent] : intermittent [Nothing helps with pain getting better] : Nothing helps with pain getting better [de-identified] : 8-4-23- One week ago pt had fall injuring the left foot and right knee. [facility] of the foot taken was told no fractures. she has been ambulating with a limp and with assistance of a cane states somewhat improved. She is ambulating with the right leg in extension.   9/1/23: Here for follow up. Doing better, no significant pain with PT. [] : no [FreeTextEntry1] : Rt Knee, LT Knee, LT foot [FreeTextEntry5] : PT fell 5 days ago. Pt fell, rolled her LT ankle and hit her RT knee into the floor.

## 2023-09-01 NOTE — PHYSICAL EXAM
[Right] : right knee [NL (0)] : extension 0 degrees [5___] : hamstring 5[unfilled]/5 [Equivocal] : equivocal Nichole [Left] : left foot and ankle [5th] : 5th [4___] : eversion 4[unfilled]/5 [] : negative Lachmann [de-identified] : pain with up on left foot  [de-identified] : plantar flexion 10 degrees [de-identified] : inversion 10 degrees [de-identified] : eversion 10 degrees [TWNoteComboBox7] : dorsiflexion 10 degrees

## 2023-09-01 NOTE — IMAGING
[AP] : anteroposterior [Lateral] : lateral [[place]] : skyline [There are no fractures, subluxations or dislocations. No significant abnormalities are seen] : There are no fractures, subluxations or dislocations. No significant abnormalities are seen

## 2024-07-26 ENCOUNTER — APPOINTMENT (OUTPATIENT)
Dept: OTOLARYNGOLOGY | Facility: CLINIC | Age: 74
End: 2024-07-26

## 2024-07-26 VITALS
BODY MASS INDEX: 21.17 KG/M2 | DIASTOLIC BLOOD PRESSURE: 70 MMHG | WEIGHT: 105 LBS | HEIGHT: 59 IN | SYSTOLIC BLOOD PRESSURE: 123 MMHG | HEART RATE: 91 BPM

## 2024-07-26 DIAGNOSIS — J31.0 CHRONIC RHINITIS: ICD-10-CM

## 2024-07-26 DIAGNOSIS — R13.10 DYSPHAGIA, UNSPECIFIED: ICD-10-CM

## 2024-07-26 DIAGNOSIS — J38.4 EDEMA OF LARYNX: ICD-10-CM

## 2024-07-26 DIAGNOSIS — R49.0 DYSPHONIA: ICD-10-CM

## 2024-07-26 DIAGNOSIS — H90.5 UNSPECIFIED SENSORINEURAL HEARING LOSS: ICD-10-CM

## 2024-07-26 PROCEDURE — 99214 OFFICE O/P EST MOD 30 MIN: CPT

## 2024-07-26 PROCEDURE — 92557 COMPREHENSIVE HEARING TEST: CPT

## 2024-07-26 PROCEDURE — 92567 TYMPANOMETRY: CPT

## 2024-07-26 NOTE — ASSESSMENT
[FreeTextEntry1] : Reviewed and reconciled medications, allergies, PMHx, PSHx, SocHx, FMHx.  Patient with h/o reflux and dysphonia with vocal cord edema presents today stating that she is frequently with a cough. she states that her cough might be from PND. She denies indigestion or heartburn. She denies using speech therapy because the speech therapist thought it was not necessary. She states that she needs more Flonase. She states that she got hearing aids a few years ago, but she gave them back because they were uncomfortable. She states that she uses her brothers old hearing aids sometimes when she is watching TV or if there is background noise. She states that she still takes Famotidine for her reflux.  Videostrobe results 8/2023: -vocal cord dysfunction and edema  Audio 5/2022: -bilateral SNHL   Physical exam: -NOSE score 5 -TNSS 0 -ears are clean and clear bilaterally -tuning forks lateralizes to the right ear -minimally deviated septum right along the floor -mildly inflamed turbinates without obstruction -no tonsils -mild uvular edema -submandibular glands slightly enlarged, but symmetric  Audio 7/26/24: -Stable from previous audio 5/2022 -Type A Tymps AU -Hearing ANL sloping to severe SNHL -88% discrimination at 75 dB AD and 70 dB AS   Plan:  Audio - results interpreted by Dr. Gambino and reviewed with the patient. -Adjust hearing aids -Continue Flonase - 2 sprays bilaterally QD, spray laterally -Continue Famotidine -FU in 1 year

## 2024-07-26 NOTE — HISTORY OF PRESENT ILLNESS
[de-identified] : Patient with h/o reflux and dysphonia with vocal cord edema presents today stating that she is frequently with a cough. she states that her cough might be from PND. She denies indigestion or heartburn. She denies using speech therapy because the speech therapist thought it was not necessary. She states that she needs more Flonase. She states that she got hearing aids a few years ago, but she gave them back because they were uncomfortable. She states that she uses her brothers old hearing aids sometimes when she is watching TV or if there is background noise. She states that she still takes Famotidine for her reflux.

## 2024-07-26 NOTE — ADDENDUM
[FreeTextEntry1] :  Documented by Humberto Taylor acting as scribe for Dr. Gambino on 07/26/2024. All Medical record entries made by the Scribe were at my, Dr. Gambino, direction and personally dictated by me on 07/26/2024 . I have reviewed the chart and agree that the record accurately reflects my personal performance of the history, physical exam, assessment and plan. I have also personally directed, reviewed, and agreed with the discharge instructions.

## 2024-07-26 NOTE — PHYSICAL EXAM
[Kirby Test Lateralizes To Right] : tone lateralization to the right [] : septum deviated to the right [Midline] : trachea located in midline position [Normal] : no rashes [de-identified] : submandibular glands slightly enlarged, but symmetric [Hearing Loss Right Only] : normal [Hearing Loss Left Only] : normal [de-identified] :  mildly inflamed turbinates without obstruction [de-identified] : no tonsils [de-identified] : mild uvular edema [FreeTextEntry2] :  sinuses nontender to percussion.  sensations intact

## 2024-07-26 NOTE — DATA REVIEWED
[de-identified] : Audio 5/2022: -bilateral SNHL  Audio 7/26/24: -Stable from previous audio 5/2022 -Type A Tymps AU -Hearing ANL sloping to severe SNHL -88% discrimination at 75 dB AD and 70 dB AS [de-identified] : Videostrobe results 8/2023: -vocal cord dysfunction and edema

## 2024-09-17 ENCOUNTER — APPOINTMENT (OUTPATIENT)
Dept: PHARMACY | Facility: CLINIC | Age: 74
End: 2024-09-17
Payer: SELF-PAY

## 2024-09-17 PROCEDURE — V5011: CPT

## 2024-09-20 ENCOUNTER — APPOINTMENT (OUTPATIENT)
Dept: ORTHOPEDIC SURGERY | Facility: CLINIC | Age: 74
End: 2024-09-20
Payer: MEDICARE

## 2024-09-20 DIAGNOSIS — Z96.641 PRESENCE OF RIGHT ARTIFICIAL HIP JOINT: ICD-10-CM

## 2024-09-20 DIAGNOSIS — Z98.1 ARTHRODESIS STATUS: ICD-10-CM

## 2024-09-20 DIAGNOSIS — M54.9 DORSALGIA, UNSPECIFIED: ICD-10-CM

## 2024-09-20 DIAGNOSIS — M51.34 OTHER INTERVERTEBRAL DISC DEGENERATION, THORACIC REGION: ICD-10-CM

## 2024-09-20 DIAGNOSIS — M79.671 PAIN IN RIGHT FOOT: ICD-10-CM

## 2024-09-20 DIAGNOSIS — S99.921A UNSPECIFIED INJURY OF RIGHT FOOT, INITIAL ENCOUNTER: ICD-10-CM

## 2024-09-20 DIAGNOSIS — S33.5XXA SPRAIN OF LIGAMENTS OF LUMBAR SPINE, INITIAL ENCOUNTER: ICD-10-CM

## 2024-09-20 PROCEDURE — 72100 X-RAY EXAM L-S SPINE 2/3 VWS: CPT

## 2024-09-20 PROCEDURE — 73630 X-RAY EXAM OF FOOT: CPT | Mod: RT

## 2024-09-20 PROCEDURE — 72170 X-RAY EXAM OF PELVIS: CPT

## 2024-09-20 PROCEDURE — 99214 OFFICE O/P EST MOD 30 MIN: CPT

## 2024-09-20 NOTE — ASSESSMENT
[FreeTextEntry1] : Offered her PT but she'd prefer to wait and see how she does with heating pad and HEP

## 2024-09-20 NOTE — HISTORY OF PRESENT ILLNESS
[Dull/Aching] : dull/aching [Localized] : localized [Sharp] : sharp [de-identified] : 9/20/24: pt rolled her foot a week and a half ago and tweaked her middle/lower  back in doing so. The pain has gotten a little better since, but she is still experiencing pain in the back and the foot is swelling again. she is ambulating with a limp  [] : no

## 2024-09-20 NOTE — IMAGING
[There are no fractures, subluxations or dislocations. No significant abnormalities are seen] : There are no fractures, subluxations or dislocations. No significant abnormalities are seen [FreeTextEntry1] : Prior lumbar fusion noted hardware is in acceptable alignment she does have degenerative findings above the level of the fusion, however there are no acute fractures or bony abnormalities noted [de-identified] : She has prior right total hip replacement noted hardware is in acceptable position no evidence of fracture appreciated

## 2024-09-20 NOTE — PHYSICAL EXAM
[Flexion] : flexion [Extension] : extension [Bending to left] : bending to left [Bending to right] : bending to right [Right] : right foot and ankle [NL (40)] : plantar flexion 40 degrees [NL 30)] : inversion 30 degrees [NL (20)] : eversion 20 degrees [5___] : eversion 5[unfilled]/5 [] : negative equivocal straight leg raise [FreeTextEntry8] : diffuse lateral foot tenderness

## 2025-09-19 ENCOUNTER — RX RENEWAL (OUTPATIENT)
Age: 75
End: 2025-09-19